# Patient Record
Sex: MALE | URBAN - METROPOLITAN AREA
[De-identification: names, ages, dates, MRNs, and addresses within clinical notes are randomized per-mention and may not be internally consistent; named-entity substitution may affect disease eponyms.]

---

## 2024-01-23 ENCOUNTER — LAB OUTSIDE AN ENCOUNTER (OUTPATIENT)
Dept: URBAN - METROPOLITAN AREA CLINIC 115 | Facility: CLINIC | Age: 79
End: 2024-01-23

## 2024-01-23 ENCOUNTER — OFFICE VISIT (OUTPATIENT)
Dept: URBAN - METROPOLITAN AREA CLINIC 115 | Facility: CLINIC | Age: 79
End: 2024-01-23
Payer: MEDICAID

## 2024-01-23 ENCOUNTER — DASHBOARD ENCOUNTERS (OUTPATIENT)
Age: 79
End: 2024-01-23

## 2024-01-23 VITALS
HEART RATE: 108 BPM | BODY MASS INDEX: 19.56 KG/M2 | SYSTOLIC BLOOD PRESSURE: 176 MMHG | RESPIRATION RATE: 15 BRPM | WEIGHT: 124.6 LBS | HEIGHT: 67 IN | TEMPERATURE: 97.8 F | DIASTOLIC BLOOD PRESSURE: 72 MMHG

## 2024-01-23 DIAGNOSIS — F10.10 CHRONIC ALCOHOL ABUSE: ICD-10-CM

## 2024-01-23 DIAGNOSIS — Z87.19 HISTORY OF MELENA: ICD-10-CM

## 2024-01-23 DIAGNOSIS — D64.89 ANEMIA DUE TO OTHER CAUSE: ICD-10-CM

## 2024-01-23 DIAGNOSIS — K80.20 GALLSTONES: ICD-10-CM

## 2024-01-23 PROBLEM — 284591009: Status: ACTIVE | Noted: 2024-01-23

## 2024-01-23 PROBLEM — 62484002: Status: ACTIVE | Noted: 2024-01-23

## 2024-01-23 PROBLEM — 300980002: Status: ACTIVE | Noted: 2024-01-23

## 2024-01-23 PROBLEM — 161539004: Status: ACTIVE | Noted: 2024-01-23

## 2024-01-23 PROBLEM — 235919008: Status: ACTIVE | Noted: 2024-01-23

## 2024-01-23 PROCEDURE — 99204 OFFICE O/P NEW MOD 45 MIN: CPT | Performed by: INTERNAL MEDICINE

## 2024-01-23 RX ORDER — AMLODIPINE BESYLATE 2.5 MG/1
1 TABLET TABLET ORAL ONCE A DAY
Status: ACTIVE | COMMUNITY

## 2024-01-23 RX ORDER — POLYETHYLENE GLYCOL-3350 AND ELECTROLYTES 236; 6.74; 5.86; 2.97; 22.74 G/274.31G; G/274.31G; G/274.31G; G/274.31G; G/274.31G
4000 ML POWDER, FOR SOLUTION ORAL ONCE
Qty: 1 KIT | Refills: 0 | OUTPATIENT
Start: 2024-01-23 | End: 2024-01-24

## 2024-01-23 NOTE — HPI-TODAY'S VISIT:
77 y/o [place] man with HTN,gallstones,alcohol use disorder with alcohol induce liver fibrosis. He present with anemia ,black stools to [facility] on 9/2023 hgb ~9g/dl.Surgery eval him for gallstones but no surgery recommended given concern for advance liver disease and some ascites.Patient went to PCP Dr. Watson on 12/2023 and hgb was back to normal around 12,denies any further black stools.Admit to drink 3 beers daily for years more when he was younger.Daughter refer prior endoscopic work-up for bleeding and ulcer was found.On exam there is no jaundice,some abdominal distention w/o tenderness,leg edema or hepatic encephalopathy.I ADVICE HIM TO STOP DRINKING.

## 2024-01-26 ENCOUNTER — OUT OF OFFICE VISIT (OUTPATIENT)
Dept: URBAN - METROPOLITAN AREA SURGERY CENTER 13 | Facility: SURGERY CENTER | Age: 79
End: 2024-01-26
Payer: MEDICAID

## 2024-01-26 DIAGNOSIS — D64.9 ANEMIA: ICD-10-CM

## 2024-01-26 DIAGNOSIS — D64.9 NORMOCYTIC ANEMIA: ICD-10-CM

## 2024-01-26 PROCEDURE — 00812 ANES LWR INTST SCR COLSC: CPT | Performed by: ANESTHESIOLOGY

## 2024-01-26 PROCEDURE — 00812 ANES LWR INTST SCR COLSC: CPT | Performed by: ANESTHESIOLOGIST ASSISTANT

## 2024-01-26 PROCEDURE — 45378 DIAGNOSTIC COLONOSCOPY: CPT | Performed by: INTERNAL MEDICINE

## 2024-01-26 PROCEDURE — G8907 PT DOC NO EVENTS ON DISCHARG: HCPCS | Performed by: INTERNAL MEDICINE
